# Patient Record
Sex: MALE
[De-identification: names, ages, dates, MRNs, and addresses within clinical notes are randomized per-mention and may not be internally consistent; named-entity substitution may affect disease eponyms.]

---

## 2018-10-28 ENCOUNTER — HOSPITAL (OUTPATIENT)
Dept: OTHER | Age: 33
End: 2018-10-28
Attending: EMERGENCY MEDICINE

## 2021-10-26 ENCOUNTER — HOSPITAL ENCOUNTER (EMERGENCY)
Dept: HOSPITAL 5 - ED | Age: 36
Discharge: HOME | End: 2021-10-26
Payer: SELF-PAY

## 2021-10-26 VITALS — DIASTOLIC BLOOD PRESSURE: 56 MMHG | SYSTOLIC BLOOD PRESSURE: 110 MMHG

## 2021-10-26 DIAGNOSIS — L30.9: ICD-10-CM

## 2021-10-26 DIAGNOSIS — L03.119: Primary | ICD-10-CM

## 2021-10-26 PROCEDURE — 99281 EMR DPT VST MAYX REQ PHY/QHP: CPT

## 2021-10-26 NOTE — EMERGENCY DEPARTMENT REPORT
ED Rash HPI





- HPI


Chief Complaint: Skin Rash


Stated Complaint: MH


Time Seen by Provider: 10/26/21 07:30


Duration: 3 weeks


Location: Upper Extremities


Suspected Cause: Unknown


Rash Symptoms: Yes Itching, Yes Blistering, No Facial Swelling, No Tongue/Oral 

Swelling, No Breathing Difficulties, No Choking Sensation, No Wheezing/Dyspnea, 

No Peeling, No Fever, No Lightheaded, No Malaise, No Myalgias


Severity: mild


Other History: This is a 35-year-old male nontoxic, well nourished in 

appearance, no acute signs of distress presents to the ED with c/o of redness, 

blistering, crusting rash to bilateral antecubital area x3 weeks.  Patient 

stated has history of eczema and has been taking over-the-counter treatment with

worsening symptoms.  Patient denies any pus or drainage.  Patient denies any 

fever, chills, nausea, vomiting, chest pain, shortness of breath, headache or 

stiff neck.  Patient denies any allergies.





ED Review of Systems


ROS: 


Stated complaint: MH


Other details as noted in HPI





Comment: All other systems reviewed and negative


Constitutional: denies: chills, fever


Eyes: denies: eye pain, eye discharge, vision change


ENT: denies: ear pain, throat pain


Respiratory: denies: cough, shortness of breath, wheezing


Cardiovascular: denies: chest pain, palpitations


Endocrine: no symptoms reported


Gastrointestinal: denies: abdominal pain, nausea, diarrhea


Genitourinary: denies: urgency, dysuria


Musculoskeletal: denies: back pain, joint swelling, arthralgia


Skin: rash.  denies: lesions, change in color, change in hair/nails, pruritus


Neurological: denies: headache, weakness, paresthesias


Psychiatric: denies: anxiety, depression


Hematological/Lymphatic: denies: easy bleeding, easy bruising





ED Past Medical Hx





- Past Medical History


Previous Medical History?: No





- Surgical History


Past Surgical History?: No





- Social History


Smoking Status: Never Smoker


Substance Use Type: None





- Medications


Home Medications: 


                                Home Medications











 Medication  Instructions  Recorded  Confirmed  Last Taken  Type


 


cephALEXin [Keflex] 500 mg PO Q8HR #21 cap 10/26/21  Unknown Rx














Rash Exam





- Exam


General: 


Vital signs noted. No distress. Alert and acting appropriately.





HEENT: No Periorbital Edema, No Conjuctival Injection, No Chemosis, No Perioral 

Edema, No Tongue Edema, No Uvular Edema, No Compromised Airway, No Drooling


Lungs: Yes Good Air Exchange (Normal Breath Sounds), No Wheezes, No Ronchi, No 

Stridor, No Cough, No Labored Respirations, No Retractions, No Use of Accessory 

Muscles, No Other Abnormal Lung Sounds


Heart: Yes Regular, No Murmur


Skin: Yes Erythema, Yes Encrustations, No Urticarial Rash, No Maculopapular 

Rash, No Morbilliform rash, No Bulla(e), No Excoriations, No Weeping, No 

Tenderness, No Edema, No Other


Other: Positive: Abdomen Normal, Neurologic Normal, Musculoskeletal Normal





ED Course


                                   Vital Signs











  10/26/21





  03:17


 


Temperature 97.8 F


 


Pulse Rate 59 L


 


Respiratory 17





Rate 


 


Blood Pressure 110/56


 


O2 Sat by Pulse 100





Oximetry 














- Reevaluation(s)


Reevaluation #1: 





10/26/21 07:38


Patient is speaking in full sentences with no signs of distress noted.





ED Medical Decision Making





- Medical Decision Making





This is a 35-year-old male that presents with eczema with cellulitis.  Patient 

is stable and was examined by me.  There is no induration, fluctuance.  No signs

 of abscess formation.  The area has been outlined with a permanent marker and 

patient was instructed to observe symptoms of increased redness or swelling and 

to return to the ER if this does occur.  I will discharge patient with Keflex.  

Patient was referred to Follow-up with a primary care doctor in 3-5 days or if 

symptoms worsen and continue return to emergency room as soon as possible.  At 

time of discharge, the patient does not seem toxic or ill in appearance.  No 

acute signs of distress noted.  Patient agrees to discharge treatment plan of 

care.  No further questions noted by the patient.


Critical care attestation.: 


If time is entered above; I have spent that time in minutes in the direct care 

of this critically ill patient, excluding procedure time.








ED Disposition


Clinical Impression: 


Cellulitis


Qualifiers:


 Site of cellulitis: extremity Site of cellulitis of extremity: upper extremity 

Laterality: unspecified laterality Qualified Code(s): L03.119 - Cellulitis of 

unspecified part of limb





Eczema


Qualifiers:


 Eczema type: unspecified Qualified Code(s): L30.9 - Dermatitis, unspecified





Disposition: 01 HOME / SELF CARE / HOMELESS


Is pt being admited?: No


Does the pt Need Aspirin: No


Condition: Stable


Instructions:  Cellulitis, Adult


Additional Instructions: 


Follow-up with a primary care doctor in 3-5 days or if symptoms worsen and 

continue return to emergency room as soon as possible. 


Prescriptions: 


cephALEXin [Keflex] 500 mg PO Q8HR #21 cap


Referrals: 


PRIMARY CARE,MD [Referring] - 3-5 Days


SHELLEY DISLA MD [Staff Physician] - 3-5 Days


Forms:  Work/School Release Form(ED)


Time of Disposition: 07:41